# Patient Record
Sex: FEMALE | Race: WHITE
[De-identification: names, ages, dates, MRNs, and addresses within clinical notes are randomized per-mention and may not be internally consistent; named-entity substitution may affect disease eponyms.]

---

## 2020-05-11 ENCOUNTER — HOSPITAL ENCOUNTER (INPATIENT)
Dept: HOSPITAL 41 - JD.OBCHECK | Age: 40
LOS: 3 days | Discharge: HOME | DRG: 560 | End: 2020-05-14
Attending: OBSTETRICS & GYNECOLOGY | Admitting: OBSTETRICS & GYNECOLOGY
Payer: COMMERCIAL

## 2020-05-11 DIAGNOSIS — Z3A.40: ICD-10-CM

## 2020-05-11 DIAGNOSIS — D25.9: ICD-10-CM

## 2020-05-11 DIAGNOSIS — O34.13: ICD-10-CM

## 2020-05-11 DIAGNOSIS — O48.0: Primary | ICD-10-CM

## 2020-05-11 RX ADMIN — Medication SCH MLS/HR: at 18:08

## 2020-05-11 NOTE — PCM.LDHP
L&D History of Present Illness





- General


Date of Service: 20


Admit Problem/Dx: 


 Patient Status Order with Admit Dx/Problem





20 16:31


Patient Status [ADT] Routine 








 Admission Diagnosis/Problem











Admission Diagnosis/Problem    Gestational hypertension














Source of Information: Patient


History Limitations: Reports: No Limitations





- History of Present Illness


Introduction:: 





Patient is a 40 y/o  at 40 6/7 wks who presents for IOL.  Was seen in 

clinic today and noted to have a mild range BP.  Otherwise doing well.  Had 

been scheduled for post dates IOL tomorrow anyhow 





- Related Data


Allergies/Adverse Reactions: 


 Allergies











Allergy/AdvReac Type Severity Reaction Status Date / Time


 


No Known Allergies Allergy   Verified 20 16:33











Home Medications: 


 Home Meds





Pnv No.95/Ferrous Fum/Folic AC [Prenatal Tablet] 1 tab PO DAILY 01/10/17 [

History]











Past Medical History


OB/GYN History: Reports: Pregnancy, Spontaneous , Other (See Below) (Hx 

of preeclampsia in 1 pregnancy)


: 8


Para: 4 (3 living children)


Endocrine/Metabolic History: Reports: Obesity/BMI 30+





- Past Surgical History


HEENT Surgical History: Reports: Oral Surgery (tooth extraction)


Musculoskeletal Surgical History: Reports: Other (See Below) (Tendon surgery, 

ankle surgery)





Social & Family History





- Family History


Family Medical History: Noncontributory





- Tobacco Use


Smoking Status *Q: Never Smoker





- Caffeine Use


Caffeine Use: Reports: Coffee





- Alcohol Use


Alcohol Use History: No





- Recreational Drug Use


Recreational Drug Use: No


Drug Use in Last 12 Months: No





H&P Review of Systems





- Review of Systems:


Review Of Systems: See Below


General: Reports: No Symptoms


Pulmonary: Reports: No Symptoms


Cardiovascular: Reports: No Symptoms


Gastrointestinal: Reports: No Symptoms


Genitourinary: Reports: No Symptoms


Musculoskeletal: Reports: No Symptoms


Psychiatric: Reports: No Symptoms


Neurological: Reports: No Symptoms





L&D Exam





- Exam


Exam: See Below





- Vital Signs


Weight: 139.253 kg





- OB Specific


Contraction Intensity: Irritability


Fetal Movement: Active


Fetal Heart Tones: Present


Fetal Heart Tones per Min: 130


Fetal Heart Rate (FHR) Variability: Moderate (6-25 bmp)


Birth Presentation: Vertex





- Peralta Score


Peralta Score Cervix Position: Posterior


Peralta Score Consistency: Soft


Peralta Score Effacement: 0-30%


Peralta Score Dilation: 1-2 cm


Peralta Score Infant's Station: -3


Peralta Score Total: 3





- Exam


General: Alert, Oriented, Cooperative


Lungs: Clear to Auscultation, Normal Respiratory Effort


Cardiovascular: Regular Rate, Regular Rhythm


GI/Abdominal Exam: Soft, Non-Tender


Genitourinary: Normal external exam


Extremities: Normal Inspection


Skin: Warm, Dry, Intact





- Patient Data


Result Diagrams: 


 20 16:50





 20 16:50





- Problem List


(1) Post-dates pregnancy


SNOMED Code(s): 45831589


   ICD Code: O48.0 - POST-TERM PREGNANCY   Status: Acute   Current Visit: Yes   


Qualifiers: 


   Post-term pregnancy type: 40-42 weeks gestation   Qualified Code(s): O48.0 - 

Post-term pregnancy   





(2) Fibroid uterus


SNOMED Code(s): 96857790


   ICD Code: D25.9 - LEIOMYOMA OF UTERUS, UNSPECIFIED   Status: Acute   Current 

Visit: Yes   


Qualifiers: 


   Uterine leiomyoma location: unspecified location   Qualified Code(s): D25.9 

- Leiomyoma of uterus, unspecified   


Problem List Initiated/Reviewed/Updated: Yes


Orders Last 24hrs: 


 Active Orders 24 hr











 Category Date Time Status


 


 Patient Status [ADT] Routine ADT  20 16:31 Ordered


 


 Activity as Tolerated [RC] PFP Care  20 16:31 Ordered


 


 Communication Order [RC] ASDIRECTED Care  20 16:31 Ordered


 


 Communication Order [RC] ASDIRECTED Care  20 16:31 Ordered


 


 Communication Order [RC] ASDIRECTED Care  20 16:31 Ordered


 


 Fetal Heart Tones [RC] ASDIRECTED Care  20 16:32 Ordered


 


 Fetal Non Stress Test [RC] PER UNIT ROUTINE Care  20 16:31 Ordered


 


 Notify Provider [RC] ASDIRECTED Care  20 16:31 Ordered


 


 Notify Provider [RC] PRN Care  20 16:31 Ordered


 


 Peripheral IV Care [RC] .AS DIRECTED Care  20 16:32 Ordered


 


 Vaginal Exam [RC] ASDIRECTED Care  20 16:31 Ordered


 


 Vital Signs [RC] ASDIRECTED Care  20 16:31 Ordered


 


 Regular Diet [DIET] Diet  20 Dinner Ordered


 


 ALANINE AMINOTRANSFERASE,ALT [CHEM] Routine Lab  20 16:31 Ordered


 


 ASPARTATE AMNIOTRANSFERASE,AST [CHEM] Routine Lab  20 16:31 Ordered


 


 CBC W/O DIFF,HEMOGRAM [HEME] Stat Lab  20 16:31 Ordered


 


 CREATININE W/GFR [CHEM] Stat Lab  20 16:31 Ordered


 


 PROTEIN/CREATININE RATIO,URINE [URCHEM] Routine Lab  20 16:31 Ordered


 


 RAPID PLASMA REAGIN,RPR [CHEM] Routine Lab  20 16:31 Ordered


 


 TYPE AND SCREEN [BBK] Stat Lab  20 16:31 Ordered


 


 Lactated Ringers @ 125 MLS/HR(1000ml) Med  20 16:45 Ordered





 Lactated Ringers [Ringers, Lactated] 1,000 ml   





 IV ASDIRECTED   


 


 Lactated Ringers [Ringers, Lactated] 1,000 ml Med  20 16:45 Ordered





 IV ASDIRECTED   


 


 Nalbuphine [Nubain] Med  20 16:31 Ordered





 10 mg IVPUSH Q2H PRN   


 


 Ondansetron [Zofran] Med  20 16:31 Ordered





 4 mg IVPUSH Q4H PRN   


 


 Oxytocin/Lactated Ringers [Pitocin in LR 10 Units/1,000 Med  20 16:45 

Ordered





 ML]   





 10 unit in 1,000 ml IV .CONTINUOUS   


 


 Oxytocin/Lactated Ringers [Pitocin in LR 10 Units/1,000 Med  20 16:45 

Ordered





 ML]   





 10 unit in 1,000 ml IV TITRATE   


 


 Sodium Chloride 0.9% [Saline Flush] Med  20 16:31 Ordered





 10 ml FLUSH ASDIRECTED PRN   


 


 Electronic Fetal Heart Tones Ext w TOCO [WOMSER] Oth  20 16:31 Ordered





 Routine   


 


 Electronic Fetal Heart Tones Internal [WOMSER] Per Unit Oth  20 16:31 

Ordered





 Routine   


 


 Peripheral IV Insertion Adult [OM.PC] Routine Oth  20 16:31 Ordered








 Medication Orders





Lactated Ringer's (Ringers, Lactated)  1,000 mls @ 40 mls/hr IV ASDIRECTED YARED


Lactated Ringer's (Ringers, Lactated)  1,000 mls @ 125 mls/hr IV ASDIRECTED YARED


Oxytocin/Lactated Ringer's (Pitocin In Lr 10 Units/1,000 Ml)  10 unit in 1,000 

mls @ 12 mls/hr IV TITRATE YARED; Protocol


Oxytocin/Lactated Ringer's (Pitocin In Lr 10 Units/1,000 Ml)  10 unit in 1,000 

mls @ 500 mls/hr IV .CONTINUOUS YARED


Nalbuphine HCl (Nubain)  10 mg IVPUSH Q2H PRN


   PRN Reason: Pain


Ondansetron HCl (Zofran)  4 mg IVPUSH Q4H PRN


   PRN Reason: Nausea/Vomiting


Sodium Chloride (Saline Flush)  10 ml FLUSH ASDIRECTED PRN


   PRN Reason: Keep Vein Open








Assessment/Plan Comment:: 





* Labs done including LFT's, creatinine, and urine protein/creatinine ratio


* Monitor BP's closely 


* Pitocin for IOL.  AROM when able 


* Monitor labor curve closely.  Known to have a 12 cm ANKIT fibroid 


* GBS negative, no need for antibiotics 


* Pain management per patient preference 


* Anticipate

## 2020-05-12 PROCEDURE — 3E033VJ INTRODUCTION OF OTHER HORMONE INTO PERIPHERAL VEIN, PERCUTANEOUS APPROACH: ICD-10-PCS | Performed by: OBSTETRICS & GYNECOLOGY

## 2020-05-12 PROCEDURE — 3E0R3BZ INTRODUCTION OF ANESTHETIC AGENT INTO SPINAL CANAL, PERCUTANEOUS APPROACH: ICD-10-PCS | Performed by: OBSTETRICS & GYNECOLOGY

## 2020-05-12 PROCEDURE — 10907ZC DRAINAGE OF AMNIOTIC FLUID, THERAPEUTIC FROM PRODUCTS OF CONCEPTION, VIA NATURAL OR ARTIFICIAL OPENING: ICD-10-PCS | Performed by: OBSTETRICS & GYNECOLOGY

## 2020-05-12 PROCEDURE — 10H07YZ INSERTION OF OTHER DEVICE INTO PRODUCTS OF CONCEPTION, VIA NATURAL OR ARTIFICIAL OPENING: ICD-10-PCS | Performed by: OBSTETRICS & GYNECOLOGY

## 2020-05-12 PROCEDURE — 0HQ9XZZ REPAIR PERINEUM SKIN, EXTERNAL APPROACH: ICD-10-PCS | Performed by: OBSTETRICS & GYNECOLOGY

## 2020-05-12 PROCEDURE — 00HU33Z INSERTION OF INFUSION DEVICE INTO SPINAL CANAL, PERCUTANEOUS APPROACH: ICD-10-PCS | Performed by: OBSTETRICS & GYNECOLOGY

## 2020-05-12 RX ADMIN — Medication SCH MLS/HR: at 08:54

## 2020-05-12 RX ADMIN — WITCH HAZEL PRN TUB: 500 SOLUTION RECTAL; TOPICAL at 18:42

## 2020-05-12 NOTE — PCM.PNLD
Labor Progress Note





- VS & Meds


Vital Signs: 


 Last Vital Signs











Temp  36.7 C   05/11/20 16:31


 


Pulse  81   05/11/20 16:31


 


Resp  18   05/11/20 16:31


 


BP  136/75   05/11/20 16:31


 


Pulse Ox  98   05/11/20 16:31











Active Medications: 


 Current Medications





Diphenhydramine HCl (Benadryl)  25 mg IVPUSH Q6H PRN


   PRN Reason: pruritis


Ephedrine Sulfate (Ephedrine Sulfate)  5 mg IVPUSH ASDIRECTED PRN


   PRN Reason: Hypotension


Fentanyl/Bupivacaine HCl (Fentanyl/Bupivacaine/Ns 2 Mcg-0.125% 100 Ml)  100 ml 

EPIDUR ASDIRECTED YARED


   Last Admin: 05/12/20 07:51 Dose:  100 ml


Lactated Ringer's (Ringers, Lactated)  1,000 mls @ 40 mls/hr IV ASDIRECTED YARED


   Last Admin: 05/12/20 08:50 Dose:  50 mls/hr


Lactated Ringer's (Ringers, Lactated)  1,000 mls @ 125 mls/hr IV ASDIRECTED YARED


Oxytocin/Lactated Ringer's (Pitocin In Lr 10 Units/1,000 Ml)  10 unit in 1,000 

mls @ 12 mls/hr IV TITRATE YARED; Protocol


   Last Admin: 05/12/20 08:54 Dose:  16 munits/min, 96 mls/hr


Oxytocin/Lactated Ringer's (Pitocin In Lr 10 Units/1,000 Ml)  10 unit in 1,000 

mls @ 500 mls/hr IV .CONTINUOUS YARED


Nalbuphine HCl (Nubain)  10 mg IVPUSH Q2H PRN


   PRN Reason: Pain


Ondansetron HCl (Zofran)  4 mg IVPUSH Q4H PRN


   PRN Reason: Nausea/Vomiting


Sodium Chloride (Saline Flush)  10 ml FLUSH ASDIRECTED PRN


   PRN Reason: Keep Vein Open





Discontinued Medications





Fentanyl (Sublimaze) Confirm Administered Dose 100 mcg .ROUTE .STK-MED ONE


   Stop: 05/12/20 07:42


   Last Admin: 05/12/20 07:50 Dose:  Not Given


Fentanyl (Sublimaze)  100 mcg IVPUSH ONETIME ONE


   Stop: 05/12/20 07:47


   Last Admin: 05/12/20 07:50 Dose:  100 mcg











- Uterine Contractions


Uterine Monitoring Mode: External [place]


Contraction Intensity: Moderate


Uterine Resting Tone: Soft





- Fetal Monitoring


Fetal Monitor Mode: External Ultrasound


Fetal Heart Rate (FHR) Baseline: 140


Fetal Heart Rate (FHR) Variability: Moderate (6-25 bmp)


Fetal Accelerations: Present, 15x15


Fetal Decelerations: Variable


Fetal Strip Review: Category II





- Vaginal Exam


Dilation (cm): 4


Effacement (Percent): 50


Station: -3


Cervical Position: Midposition





- Labor Progress (Free Text)


Labor Progress: 





Doing well.  Comfortable with epidural.  IUPC and FSE placed.  pitocin at 16.  

Continue management

## 2020-05-12 NOTE — PCM.PREANE
Preanesthetic Assessment





- Procedure


Proposed Procedure: 





alayna





- Anesthesia/Transfusion/Family Hx


Anesthesia History: Prior Anesthesia Without Reaction


Family History of Anesthesia Reaction: No


Transfusion History: No Prior Transfusion(s)





- Review of Systems


General: No Symptoms


Pulmonary: No Symptoms


Cardiovascular: No Symptoms


Gastrointestinal: No Symptoms


Neurological: No Symptoms





- Physical Assessment


Vital Signs: 





 Last Vital Signs











Temp  98.0 F   20 16:31


 


Pulse  81   20 16:31


 


Resp  18   20 16:31


 


BP  136/75   20 16:31


 


Pulse Ox  98   20 16:31











Height: 5 ft 11 in


Weight: 139.253 kg


ASA Class: 3


Mental Status: Alert & Oriented x3


Airway Class: Mallampati = 1


Dentition: Reports: Normal Dentition


Thyro-Mental Finger Breadths: 3


Mouth Opening Finger Breadths: 3


ROM/Head Extension: Full


Lungs: Clear to Auscultation, Normal Respiratory Effort


Cardiovascular: Regular Rate, Regular Rhythm





- Lab


Values: 





 Laboratory Last Values











WBC  12.52 K/mm3 (3.98-10.04)  H  20  16:50    


 


RBC  4.87 M/mm3 (3.98-5.22)   20  16:50    


 


Hgb  13.5 gm/dl (11.2-15.7)  D 20  16:50    


 


Hct  42.7 % (34.1-44.9)   20  16:50    


 


MCV  87.7 fl (79.4-94.8)  D 20  16:50    


 


MCH  27.7 pg (25.6-32.2)   20  16:50    


 


MCHC  31.6 g/dl (32.2-35.5)  L  20  16:50    


 


RDW Std Deviation  49.2 fL (36.4-46.3)  H  20  16:50    


 


Plt Count  243 K/mm3 (182-369)   20  16:50    


 


MPV  12.3 fl (9.4-12.3)   20  16:50    


 


Creatinine  0.8 mg/dL (0.55-1.02)   20  16:50    


 


Est Cr Clr Drug Dosing  106.57 mL/min  20  16:50    


 


Estimated GFR (MDRD)  > 60 mL/min (>60)   20  16:50    


 


AST  19 U/L (15-37)   20  16:50    


 


ALT  28 U/L (14-59)   20  16:50    


 


Ur Random Creatinine  64.8 mg/dL (30.0-125.0)   20  19:55    


 


U Random Total Protein  8.4 mg/dL (0.0-11.8)   20  19:55    


 


Protein/Creatinin Ratio  129.6 mg/g (0-149)   20  19:55    


 


RPR  Non-reactive  (NONREACTIVE)   20  16:50    


 


Blood Type  O POSITIVE   20  16:50    


 


Gel Antibody Screen  Negative   20  16:50    














- Allergies


Allergies/Adverse Reactions: 


 Allergies











Allergy/AdvReac Type Severity Reaction Status Date / Time


 


No Known Allergies Allergy   Verified 20 16:33














- Blood


Blood Available: No





- Acknowledgements


Anesthesia Type Planned: Epidural


Pt an Appropriate Candidate for the Planned Anesthesia: Yes


Alternatives and Risks of Anesthesia Discussed w Pt/Guardian: Yes


Pt/Guardian Understands and Agrees with Anesthesia Plan: Yes





PreAnesthesia Questionnaire





- Past Health History


Medical/Surgical History: Denies Medical/Surgical History


Cardiovascular History: Reports: None


Respiratory History: Reports: None


Gastrointestinal History: Reports: GERD (with preg)


OB/GYN History: Reports: Pregnancy, Spontaneous , Other (See Below) (Hx 

of preeclampsia in 1 pregnancy)


: 8


Para: 3


Endocrine/Metabolic History: Reports: Obesity/BMI 30+





- Past Surgical History


HEENT Surgical History: Reports: Oral Surgery (tooth extraction)


Musculoskeletal Surgical History: Reports: Other (See Below) (Tendon surgery, 

ankle surgery)





- SUBSTANCE USE


Smoking Status *Q: Never Smoker


Tobacco Use Within Last Twelve Months: No


Second Hand Smoke Exposure: No


Days Per Week of Alcohol Use: 0


Recreational Drug Use History: No





- HOME MEDS


Home Medications: 


 Home Meds





Pnv No.95/Ferrous Fum/Folic AC [Prenatal Tablet] 1 tab PO DAILY 01/10/17 [

History]











- CURRENT (IN HOUSE) MEDS


Current Meds: 





 Current Medications





Fentanyl/Bupivacaine HCl (Fentanyl/Bupivacaine/Ns 2 Mcg-0.125% 100 Ml)  100 ml 

EPIDUR ASDIRECTED On license of [facility]


   Last Admin: 20 07:51 Dose:  100 ml


Lactated Ringer's (Ringers, Lactated)  1,000 mls @ 40 mls/hr IV ASDIRECTED YARED


   Last Admin: 20 07:22 Dose:  999 mls/hr


Lactated Ringer's (Ringers, Lactated)  1,000 mls @ 125 mls/hr IV ASDIRECTED YARED


Oxytocin/Lactated Ringer's (Pitocin In Lr 10 Units/1,000 Ml)  10 unit in 1,000 

mls @ 12 mls/hr IV TITRATE YARED; Protocol


   Last Titration: 20 00:30 Dose:  16 munits/min, 96 mls/hr


Oxytocin/Lactated Ringer's (Pitocin In Lr 10 Units/1,000 Ml)  10 unit in 1,000 

mls @ 500 mls/hr IV .CONTINUOUS YARED


Nalbuphine HCl (Nubain)  10 mg IVPUSH Q2H PRN


   PRN Reason: Pain


Ondansetron HCl (Zofran)  4 mg IVPUSH Q4H PRN


   PRN Reason: Nausea/Vomiting


Sodium Chloride (Saline Flush)  10 ml FLUSH ASDIRECTED PRN


   PRN Reason: Keep Vein Open





Discontinued Medications





Fentanyl (Sublimaze) Confirm Administered Dose 100 mcg .ROUTE .STK-MED ONE


   Stop: 20 07:42


   Last Admin: 20 07:50 Dose:  Not Given


Fentanyl (Sublimaze)  100 mcg IVPUSH ONETIME ONE


   Stop: 20 07:47


   Last Admin: 20 07:50 Dose:  100 mcg

## 2020-05-12 NOTE — PCM.DEL
L & D Note





- General Info


Date of Service: 20





- Delivery Note


Labor: Induced by ARM, Induced by Oxytocin


Delivery Outcome: Livebirth


Infant Delivery Method: Spontaneous Vaginal Delivery-Single


Infant Delivery Mode: Spontaneous


Birth Presentation: Right Occiput Posterior (ROP) (Mostly straight OP)


Nuchal Cord: None


Anesthesia Type: Epidural


Amniotic Fluid Description: Clear


Episiotomy Type: None


Laceration: 1st Degree


Suture type: Vicryl


Suture size: 2-0


Placenta: Intact, Spontaneous


Cord: 3 Vessels


Estimated Blood Loss: 300


: Bulb Syringe, Stimulated, Warmed, [place] Used, Warmer Used


Delivery Comments (Free Text/Narrative):: 





Patient found to be complete and began pushing.  With maternal pushing effort 

fetal head delivered from straight OP presentation.  No nuchal cord present.  

With gentle downward traction fetal shoulders and body delivered.  Infant 

placed on maternal abdomen. Cord clamped and cut.  Cord blood obtained.  Cord 

blood obtained.  Placenta allowed time to separate and expelled intact.  

Patient with moderate amount of slow bleeding and so given 600 mcg of buccal 

cytotec with good response 





- General Info


Date of Service: 20





- Patient Data


Vitals - Most Recent: 


 Last Vital Signs











Temp  36.7 C   20 16:31


 


Pulse  81   20 16:31


 


Resp  18   20 16:31


 


BP  136/75   20 16:31


 


Pulse Ox  98   20 16:31











Weight - Most Recent: 139.253 kg


I&O - Last 24 Hours: 


 Intake & Output











 20





 06:59 14:59 22:59


 


Intake Total   


 


Balance   











Lab Results Last 24 Hours: 


 Laboratory Results - last 24 hr











  20 Range/Units





  16:50 16:50 16:50 


 


WBC    12.52 H  (3.98-10.04)  K/mm3


 


RBC    4.87  (3.98-5.22)  M/mm3


 


Hgb    13.5  D  (11.2-15.7)  gm/dl


 


Hct    42.7  (34.1-44.9)  %


 


MCV    87.7  D  (79.4-94.8)  fl


 


MCH    27.7  (25.6-32.2)  pg


 


MCHC    31.6 L  (32.2-35.5)  g/dl


 


RDW Std Deviation    49.2 H  (36.4-46.3)  fL


 


Plt Count    243  (182-369)  K/mm3


 


MPV    12.3  (9.4-12.3)  fl


 


Creatinine     (0.55-1.02)  mg/dL


 


Est Cr Clr Drug Dosing     mL/min


 


Estimated GFR (MDRD)     (>60)  mL/min


 


AST  19    (15-37)  U/L


 


ALT  28    (14-59)  U/L


 


Ur Random Creatinine     (30.0-125.0)  mg/dL


 


U Random Total Protein     (0.0-11.8)  mg/dL


 


Protein/Creatinin Ratio     (0-149)  mg/g


 


RPR   Non-reactive   (NONREACTIVE)  


 


Blood Type     


 


Gel Antibody Screen     














  20 Range/Units





  16:50 16:50 19:55 


 


WBC     (3.98-10.04)  K/mm3


 


RBC     (3.98-5.22)  M/mm3


 


Hgb     (11.2-15.7)  gm/dl


 


Hct     (34.1-44.9)  %


 


MCV     (79.4-94.8)  fl


 


MCH     (25.6-32.2)  pg


 


MCHC     (32.2-35.5)  g/dl


 


RDW Std Deviation     (36.4-46.3)  fL


 


Plt Count     (182-369)  K/mm3


 


MPV     (9.4-12.3)  fl


 


Creatinine  0.8    (0.55-1.02)  mg/dL


 


Est Cr Clr Drug Dosing  106.57    mL/min


 


Estimated GFR (MDRD)  > 60    (>60)  mL/min


 


AST     (15-37)  U/L


 


ALT     (14-59)  U/L


 


Ur Random Creatinine    64.8  (30.0-125.0)  mg/dL


 


U Random Total Protein    8.4  (0.0-11.8)  mg/dL


 


Protein/Creatinin Ratio    129.6  (0-149)  mg/g


 


RPR     (NONREACTIVE)  


 


Blood Type   O POSITIVE   


 


Gel Antibody Screen   Negative   











Med Orders - Current: 


 Current Medications





Diphenhydramine HCl (Benadryl)  25 mg IVPUSH Q6H PRN


   PRN Reason: pruritis


Ephedrine Sulfate (Ephedrine Sulfate)  5 mg IVPUSH ASDIRECTED PRN


   PRN Reason: Hypotension


Fentanyl/Bupivacaine HCl (Fentanyl/Bupivacaine/Ns 2 Mcg-0.125% 100 Ml)  100 ml 

EPIDUR ASDIRECTED YARED


   Last Admin: 20 07:51 Dose:  100 ml


Lactated Ringer's (Ringers, Lactated)  1,000 mls @ 40 mls/hr IV ASDIRECTED YARED


   Last Admin: 20 08:50 Dose:  50 mls/hr


Lactated Ringer's (Ringers, Lactated)  1,000 mls @ 125 mls/hr IV ASDIRECTED YARED


Oxytocin/Lactated Ringer's (Pitocin In Lr 10 Units/1,000 Ml)  10 unit in 1,000 

mls @ 12 mls/hr IV TITRATE YARED; Protocol


   Last Titration: 20 13:42 Dose:  20 munits/min, 120 mls/hr


Oxytocin/Lactated Ringer's (Pitocin In Lr 10 Units/1,000 Ml)  10 unit in 1,000 

mls @ 500 mls/hr IV .CONTINUOUS YARED


Nalbuphine HCl (Nubain)  10 mg IVPUSH Q2H PRN


   PRN Reason: Pain


Ondansetron HCl (Zofran)  4 mg IVPUSH Q4H PRN


   PRN Reason: Nausea/Vomiting


Sodium Chloride (Saline Flush)  10 ml FLUSH ASDIRECTED PRN


   PRN Reason: Keep Vein Open





Discontinued Medications





Fentanyl (Sublimaze) Confirm Administered Dose 100 mcg .ROUTE .STK-MED ONE


   Stop: 20 07:42


   Last Admin: 20 07:50 Dose:  Not Given


Fentanyl (Sublimaze)  100 mcg IVPUSH ONETIME ONE


   Stop: 20 07:47


   Last Admin: 20 07:50 Dose:  100 mcg


Misoprostol (Cytotec) Confirm Administered Dose 200 mcg .ROUTE .STK-MED ONE


   Stop: 20 16:28











- Problem List & Annotations


(1) Post-dates pregnancy


SNOMED Code(s): 22772917


   Code(s): O48.0 - POST-TERM PREGNANCY   Status: Acute   Current Visit: Yes   


Qualifiers: 


   Post-term pregnancy type: 40-42 weeks gestation   Qualified Code(s): O48.0 - 

Post-term pregnancy   





(2) Fibroid uterus


SNOMED Code(s): 33671733


   Code(s): D25.9 - LEIOMYOMA OF UTERUS, UNSPECIFIED   Status: Acute   Current 

Visit: Yes   


Qualifiers: 


   Uterine leiomyoma location: unspecified location   Qualified Code(s): D25.9 

- Leiomyoma of uterus, unspecified   





(3) Vaginal delivery


SNOMED Code(s): 680060335


   Code(s): O80 - ENCOUNTER FOR FULL-TERM UNCOMPLICATED DELIVERY   Status: 

Acute   Current Visit: Yes   





- Problem List Review


Problem List Initiated/Reviewed/Updated: Yes





- My Orders


Last 24 Hours: 


My Active Orders





20 16:31


Patient Status [ADT] Routine 


Activity as Tolerated [RC] PFP 


Communication Order [RC] ASDIRECTED 


Fetal Non Stress Test [RC] PER UNIT ROUTINE 


Notify Provider [RC] ASDIRECTED 


Notify Provider [RC] PRN 


Vaginal Exam [RC] ASDIRECTED 


Vital Signs [RC] ASDIRECTED 


Nalbuphine [Nubain]   10 mg IVPUSH Q2H PRN 


Ondansetron [Zofran]   4 mg IVPUSH Q4H PRN 


Sodium Chloride 0.9% [Saline Flush]   10 ml FLUSH ASDIRECTED PRN 


Electronic Fetal Heart Tones Ext w TOCO [WOMSER] Routine 


Electronic Fetal Heart Tones Internal [WOMSER] Per Unit Routine 


Peripheral IV Insertion Adult [OM.PC] Routine 





20 16:32


Fetal Heart Tones [RC] ASDIRECTED 


Peripheral IV Care [RC] .AS DIRECTED 





20 16:45


Lactated Ringers [Ringers, Lactated] 1,000 ml IV ASDIRECTED 


Lactated Ringers [Ringers, Lactated] 1,000 ml IV ASDIRECTED 


Oxytocin/Lactated Ringers [Pitocin in LR 10 Units/1,000 ML] 10 unit in 1,000 ml 

IV .CONTINUOUS 


Oxytocin/Lactated Ringers [Pitocin in LR 10 Units/1,000 ML] 10 unit in 1,000 ml 

IV TITRATE 





20 Dinner


Regular Diet [DIET] 














- Assessment


Assessment:: 





PPD#0 





- Plan


Plan:: 





* Routine cares 


* Monitor BP's closely 


* Breast feeding 


* Discharge home in 1-2 days

## 2020-05-12 NOTE — PCM.PNLD
Labor Progress Note





- VS & Meds


Vital Signs: 


 Last Vital Signs











Temp  36.7 C   05/11/20 16:31


 


Pulse  81   05/11/20 16:31


 


Resp  18   05/11/20 16:31


 


BP  136/75   05/11/20 16:31


 


Pulse Ox  98   05/11/20 16:31











Active Medications: 


 Current Medications





Lactated Ringer's (Ringers, Lactated)  1,000 mls @ 40 mls/hr IV ASDIRECTED YARED


   Last Admin: 05/11/20 18:08 Dose:  40 mls/hr


Lactated Ringer's (Ringers, Lactated)  1,000 mls @ 125 mls/hr IV ASDIRECTED YARED


Oxytocin/Lactated Ringer's (Pitocin In Lr 10 Units/1,000 Ml)  10 unit in 1,000 

mls @ 12 mls/hr IV TITRATE YARED; Protocol


   Last Titration: 05/11/20 22:45 Dose:  12 munits/min, 72 mls/hr


Oxytocin/Lactated Ringer's (Pitocin In Lr 10 Units/1,000 Ml)  10 unit in 1,000 

mls @ 500 mls/hr IV .CONTINUOUS YARED


Nalbuphine HCl (Nubain)  10 mg IVPUSH Q2H PRN


   PRN Reason: Pain


Ondansetron HCl (Zofran)  4 mg IVPUSH Q4H PRN


   PRN Reason: Nausea/Vomiting


Sodium Chloride (Saline Flush)  10 ml FLUSH ASDIRECTED PRN


   PRN Reason: Keep Vein Open











- Uterine Contractions


Uterine Monitoring Mode: External Poole


Contraction Intensity: Moderate


Uterine Resting Tone: Soft





- Fetal Monitoring


Fetal Monitor Mode: External Ultrasound


Fetal Heart Rate (FHR) Baseline: 130


Fetal Heart Rate (FHR) Variability: Moderate (6-25 bmp)


Fetal Accelerations: Present, 15x15


Fetal Decelerations: None


Fetal Strip Review: Category I





- Vaginal Exam


Dilation (cm):  3


Effacement (Percent): 50


Station: -3


Cervical Position: Posterior





- Labor Progress (Free Text)


Labor Progress: 


Patient doing well.  Getting more uncomfortable.  Bedside US shows baby well 

engaged and feels so as well. AROM performed

## 2020-05-12 NOTE — PCM.PNLD
Labor Progress Note





- VS & Meds


Vital Signs: 


 Last Vital Signs











Temp  36.7 C   05/11/20 16:31


 


Pulse  81   05/11/20 16:31


 


Resp  18   05/11/20 16:31


 


BP  136/75   05/11/20 16:31


 


Pulse Ox  98   05/11/20 16:31











Active Medications: 


 Current Medications





Diphenhydramine HCl (Benadryl)  25 mg IVPUSH Q6H PRN


   PRN Reason: pruritis


Ephedrine Sulfate (Ephedrine Sulfate)  5 mg IVPUSH ASDIRECTED PRN


   PRN Reason: Hypotension


Fentanyl/Bupivacaine HCl (Fentanyl/Bupivacaine/Ns 2 Mcg-0.125% 100 Ml)  100 ml 

EPIDUR ASDIRECTED YARED


   Last Admin: 05/12/20 07:51 Dose:  100 ml


Lactated Ringer's (Ringers, Lactated)  1,000 mls @ 40 mls/hr IV ASDIRECTED YARED


   Last Admin: 05/12/20 08:50 Dose:  50 mls/hr


Lactated Ringer's (Ringers, Lactated)  1,000 mls @ 125 mls/hr IV ASDIRECTED YARED


Oxytocin/Lactated Ringer's (Pitocin In Lr 10 Units/1,000 Ml)  10 unit in 1,000 

mls @ 12 mls/hr IV TITRATE YARED; Protocol


   Last Admin: 05/12/20 08:54 Dose:  16 munits/min, 96 mls/hr


Oxytocin/Lactated Ringer's (Pitocin In Lr 10 Units/1,000 Ml)  10 unit in 1,000 

mls @ 500 mls/hr IV .CONTINUOUS YARED


Nalbuphine HCl (Nubain)  10 mg IVPUSH Q2H PRN


   PRN Reason: Pain


Ondansetron HCl (Zofran)  4 mg IVPUSH Q4H PRN


   PRN Reason: Nausea/Vomiting


Sodium Chloride (Saline Flush)  10 ml FLUSH ASDIRECTED PRN


   PRN Reason: Keep Vein Open





Discontinued Medications





Fentanyl (Sublimaze) Confirm Administered Dose 100 mcg .ROUTE .STK-MED ONE


   Stop: 05/12/20 07:42


   Last Admin: 05/12/20 07:50 Dose:  Not Given


Fentanyl (Sublimaze)  100 mcg IVPUSH ONETIME ONE


   Stop: 05/12/20 07:47


   Last Admin: 05/12/20 07:50 Dose:  100 mcg











- Uterine Contractions


Uterine Monitoring Mode: External Counce


Contraction Intensity: Moderate


Uterine Resting Tone: Soft





- Fetal Monitoring


Fetal Monitor Mode: External Ultrasound


Fetal Heart Rate (FHR) Baseline: 140


Fetal Heart Rate (FHR) Variability: Moderate (6-25 bmp)


Fetal Accelerations: Present, 15x15


Fetal Decelerations: Variable, Intermittent (<50% x 20 min)


Fetal Strip Review: Category II





- Vaginal Exam


Dilation (cm): 4


Effacement (Percent): 5


Station: -3


Cervical Position: Midposition





- Labor Progress (Free Text)


Labor Progress: 





Patient with a few late and variable decelerations.  SVE with slightly more 

dilation, but still -3.  Pitocin still at 16.  Continue present management.

## 2020-05-13 RX ADMIN — WITCH HAZEL PRN TUB: 500 SOLUTION RECTAL; TOPICAL at 18:09

## 2020-05-13 NOTE — [id number]
Post Anesthesia Note





- EVALUATION WITHIN 48HRS OF ANESTHETIC


Vital Signs in Normal Range: Yes


Patient Participated in Evaluation: Yes


Respiratory Function Stable: Yes


Airway Patent: Yes


Cardiovascular Function Stable: Yes


Hydration Status Stable: Yes


Pain Control Satisfactory: Yes


Nausea and Vomiting Control Satisfactory: Yes


Mental Status Recovered: Yes


Vital Signs: 


 Last Vital Signs











Temp  36.5 C   05/13/20 03:39


 


Pulse  82   05/13/20 03:39


 


Resp  18   05/13/20 03:39


 


BP  124/62   05/13/20 03:39


 


Pulse Ox  96   05/13/20 03:39

## 2020-05-13 NOTE — PCM.DCSUM1
**Discharge Summary





- Discharge Data


Discharge Date: 20


Discharge Disposition: Home, Self-Care 01


Condition: Good





- Referral to Home Health


Primary Care Physician: 


Calista Watson MD








- Discharge Diagnosis/Problem(s)


(1) Post-dates pregnancy


SNOMED Code(s): 07774260


   ICD Code: O48.0 - POST-TERM PREGNANCY   Status: Acute   Current Visit: Yes   


Qualifiers: 


   Post-term pregnancy type: 40-42 weeks gestation   Qualified Code(s): O48.0 - 

Post-term pregnancy   





(2) Fibroid uterus


SNOMED Code(s): 48364569


   ICD Code: D25.9 - LEIOMYOMA OF UTERUS, UNSPECIFIED   Status: Acute   Current 

Visit: Yes   


Qualifiers: 


   Uterine leiomyoma location: unspecified location   Qualified Code(s): D25.9 

- Leiomyoma of uterus, unspecified   





(3) Vaginal delivery


SNOMED Code(s): 910105224


   ICD Code: O80 - ENCOUNTER FOR FULL-TERM UNCOMPLICATED DELIVERY   Status: 

Acute   Current Visit: Yes   





- Patient Summary/Data


Complications: None


Consults: 


None


Recommended Follow-up Testing/Procedures: 


Follow up in 3 weeks for postpartum check 


Hospital Course: 


38 y/o  at 40 6/7 wks who was seen for clinic appt and found to have a 

mild range BP.  Was sent to L&D for IOL.  Mostly normal BP's in induction.  

Induction complicated by known 12 cm ANKIT fibroid.  Induction done with pitocin 

and eventually AROM.  Patient made slow progress to complete dilation, but did 

eventually undergo an uncomplicated .  See delivery note.  Postpartum she 

did well and was discharged home on PPD#2





- Patient Instructions


Diet: Regular Diet as Tolerated


Activity: As Tolerated


Activity, Other: Pelvic rest for 6 weeks 


Driving: May Drive Today


Showering/Bathing: May Shower


Showering/Bathing, Other: May bathe 


Notify Provider of: Fever, Increased Pain, Swelling and Redness, Drainage, 

Nausea and/or Vomiting





- Discharge Plan


*PRESCRIPTION DRUG MONITORING PROGRAM REVIEWED*: No


*COPY OF PRESCRIPTION DRUG MONITORING REPORT IN PATIENT LAURO: No


Home Medications: 


 Home Meds





Pnv No.95/Ferrous Fum/Folic AC [Prenatal Tablet] 1 tab PO DAILY 01/10/17 [

History]


Acetaminophen [Tylenol] 650 mg PO Q4H PRN  tablet 20 [Rx]


Ibuprofen [Motrin] 600 mg PO Q6H PRN  tablet 20 [Rx]








Referrals: 


Calista Watson MD [Primary Care Provider] -  (3 weeks for postpartum check - 

can be via telehealth)





- Discharge Summary/Plan Comment


DC Time >30 min.: No





- Patient Data


Vitals - Most Recent: 


 Last Vital Signs











Temp  36.5 C   20 03:39


 


Pulse  82   20 03:39


 


Resp  18   20 03:39


 


BP  124/62   20 03:39


 


Pulse Ox  96   20 03:39











Weight - Most Recent: 139.253 kg


I&O - Last 24 hours: 


 Intake & Output











 20





 22:59 06:59 14:59


 


Intake Total 4300  


 


Output Total 1650  


 


Balance 2650  











Med Orders - Current: 


 Current Medications





Acetaminophen (Tylenol)  650 mg PO Q4H PRN


   PRN Reason: mild pain or fever


Benzocaine/Menthol (Dermoplast Pain Relief Spray)  0 gm TOP ASDIRECTED PRN


   PRN Reason: Perineal Comfort Measure


   Last Admin: 20 18:42 Dose:  1 can


Ibuprofen (Motrin)  600 mg PO Q6H PRN


   PRN Reason: Mild pain or fever


   Last Admin: 20 04:02 Dose:  600 mg


Witch Hazel (Tucks)  1 pad TOP ASDIRECTED PRN


   PRN Reason: Perineal Comfort Measure


   Last Admin: 20 18:42 Dose:  1 tub





Discontinued Medications





Diphenhydramine HCl (Benadryl)  25 mg IVPUSH Q6H PRN


   PRN Reason: pruritis


Ephedrine Sulfate (Ephedrine Sulfate)  5 mg IVPUSH ASDIRECTED PRN


   PRN Reason: Hypotension


Fentanyl (Sublimaze) Confirm Administered Dose 100 mcg .ROUTE .STK-MED ONE


   Stop: 20 07:42


   Last Admin: 20 07:50 Dose:  Not Given


Fentanyl (Sublimaze)  100 mcg IVPUSH ONETIME ONE


   Stop: 20 07:47


   Last Admin: 20 07:50 Dose:  100 mcg


Fentanyl/Bupivacaine HCl (Fentanyl/Bupivacaine/Ns 2 Mcg-0.125% 100 Ml)  100 ml 

EPIDUR ASDIRECTED YARED


   Last Admin: 20 07:51 Dose:  100 ml


Lactated Ringer's (Ringers, Lactated)  1,000 mls @ 40 mls/hr IV ASDIRECTED YARED


   Last Admin: 20 08:50 Dose:  50 mls/hr


Lactated Ringer's (Ringers, Lactated)  1,000 mls @ 125 mls/hr IV ASDIRECTED YARED


Oxytocin/Lactated Ringer's (Pitocin In Lr 10 Units/1,000 Ml)  10 unit in 1,000 

mls @ 12 mls/hr IV TITRATE YARED; Protocol


   Last Titration: 20 13:42 Dose:  20 munits/min, 120 mls/hr


Oxytocin/Lactated Ringer's (Pitocin In Lr 10 Units/1,000 Ml)  10 unit in 1,000 

mls @ 500 mls/hr IV .CONTINUOUS YARED


   Last Admin: 20 17:00 Dose:  500 mls/hr


Misoprostol (Cytotec) Confirm Administered Dose 200 mcg .ROUTE .K-MED ONE


   Stop: 20 16:28


   Last Admin: 20 16:42 Dose:  600 mcg


Misoprostol (Cytotec)  600 mcg PO NOW STA


   Stop: 20 16:40


   Last Admin: 20 17:41 Dose:  Not Given


Nalbuphine HCl (Nubain)  10 mg IVPUSH Q2H PRN


   PRN Reason: Pain


Ondansetron HCl (Zofran)  4 mg IVPUSH Q4H PRN


   PRN Reason: Nausea/Vomiting


Sodium Chloride (Saline Flush)  10 ml FLUSH ASDIRECTED PRN


   PRN Reason: Keep Vein Open

## 2020-05-13 NOTE — PCM.PNPP
- General Info


Date of Service: 20


Functional Status: Reports: Pain Controlled, Tolerating Diet, Ambulating, 

Urinating





- Review of Systems


General: Reports: No Symptoms


Pulmonary: Reports: No Symptoms


Cardiovascular: Reports: No Symptoms


Gastrointestinal: Reports: No Symptoms


Genitourinary: Reports: No Symptoms


Musculoskeletal: Reports: No Symptoms


Neurological: Reports: No Symptoms





- Patient Data


Vital Signs - Most Recent: 


 Last Vital Signs











Temp  36.5 C   20 03:39


 


Pulse  82   20 03:39


 


Resp  18   20 03:39


 


BP  124/62   20 03:39


 


Pulse Ox  96   20 03:39











Weight - Most Recent: 139.253 kg


I&O - Last 24 Hours: 


 Intake & Output











 20





 22:59 06:59 14:59


 


Intake Total 4300  


 


Output Total 1650  


 


Balance 2650  











Med Orders - Current: 


 Current Medications





Acetaminophen (Tylenol)  650 mg PO Q4H PRN


   PRN Reason: mild pain or fever


Benzocaine/Menthol (Dermoplast Pain Relief Spray)  0 gm TOP ASDIRECTED PRN


   PRN Reason: Perineal Comfort Measure


   Last Admin: 20 18:42 Dose:  1 can


Ibuprofen (Motrin)  600 mg PO Q6H PRN


   PRN Reason: Mild pain or fever


   Last Admin: 20 04:02 Dose:  600 mg


Witch Hazel (Tucks)  1 pad TOP ASDIRECTED PRN


   PRN Reason: Perineal Comfort Measure


   Last Admin: 20 18:42 Dose:  1 tub





Discontinued Medications





Diphenhydramine HCl (Benadryl)  25 mg IVPUSH Q6H PRN


   PRN Reason: pruritis


Ephedrine Sulfate (Ephedrine Sulfate)  5 mg IVPUSH ASDIRECTED PRN


   PRN Reason: Hypotension


Fentanyl (Sublimaze) Confirm Administered Dose 100 mcg .ROUTE .STK-MED ONE


   Stop: 20 07:42


   Last Admin: 20 07:50 Dose:  Not Given


Fentanyl (Sublimaze)  100 mcg IVPUSH ONETIME ONE


   Stop: 20 07:47


   Last Admin: 20 07:50 Dose:  100 mcg


Fentanyl/Bupivacaine HCl (Fentanyl/Bupivacaine/Ns 2 Mcg-0.125% 100 Ml)  100 ml 

EPIDUR ASDIRECTED YARED


   Last Admin: 20 07:51 Dose:  100 ml


Lactated Ringer's (Ringers, Lactated)  1,000 mls @ 40 mls/hr IV ASDIRECTED YARED


   Last Admin: 20 08:50 Dose:  50 mls/hr


Lactated Ringer's (Ringers, Lactated)  1,000 mls @ 125 mls/hr IV ASDIRECTED YARED


Oxytocin/Lactated Ringer's (Pitocin In Lr 10 Units/1,000 Ml)  10 unit in 1,000 

mls @ 12 mls/hr IV TITRATE YARED; Protocol


   Last Titration: 20 13:42 Dose:  20 munits/min, 120 mls/hr


Oxytocin/Lactated Ringer's (Pitocin In Lr 10 Units/1,000 Ml)  10 unit in 1,000 

mls @ 500 mls/hr IV .CONTINUOUS YARED


   Last Admin: 20 17:00 Dose:  500 mls/hr


Misoprostol (Cytotec) Confirm Administered Dose 200 mcg .ROUTE .STK-MED ONE


   Stop: 20 16:28


   Last Admin: 20 16:42 Dose:  600 mcg


Misoprostol (Cytotec)  600 mcg PO NOW STA


   Stop: 20 16:40


   Last Admin: 20 17:41 Dose:  Not Given


Nalbuphine HCl (Nubain)  10 mg IVPUSH Q2H PRN


   PRN Reason: Pain


Ondansetron HCl (Zofran)  4 mg IVPUSH Q4H PRN


   PRN Reason: Nausea/Vomiting


Sodium Chloride (Saline Flush)  10 ml FLUSH ASDIRECTED PRN


   PRN Reason: Keep Vein Open











- Infant Interaction


Infant Disposition, Postpartum:  in Room with Family


Infant Interaction: Holding Infant


Infant Feeding:  Infant; Nursed Well


Support Person: 





- Postpartum Recovery Exam


Fundal Tone: Firm


Fundal Level: 1 Fingerbreadths Below Umbilicus


Fundal Placement: Midline


Lochia Amount: Small, Moderate


Lochia Color: Rubra/Red


Bladder Status: Voiding


Urinary Elimination: Voided





- Exam


General: Alert, Oriented, Cooperative


GI/Abdominal Exam: Soft, Non-Tender


Extremities: Normal Inspection


Skin: Warm, Dry, Intact





- Problem List & Annotations


(1) Post-dates pregnancy


SNOMED Code(s): 06347488


   Code(s): O48.0 - POST-TERM PREGNANCY   Status: Acute   Current Visit: Yes   


Qualifiers: 


   Post-term pregnancy type: 40-42 weeks gestation   Qualified Code(s): O48.0 - 

Post-term pregnancy   





(2) Fibroid uterus


SNOMED Code(s): 03939227


   Code(s): D25.9 - LEIOMYOMA OF UTERUS, UNSPECIFIED   Status: Acute   Current 

Visit: Yes   


Qualifiers: 


   Uterine leiomyoma location: unspecified location   Qualified Code(s): D25.9 

- Leiomyoma of uterus, unspecified   





(3) Vaginal delivery


SNOMED Code(s): 141988768


   Code(s): O80 - ENCOUNTER FOR FULL-TERM UNCOMPLICATED DELIVERY   Status: 

Acute   Current Visit: Yes   





- Problem List Review


Problem List Initiated/Reviewed/Updated: Yes





- My Orders


Last 24 Hours: 


My Active Orders





20 16:38


Resuscitation Status Routine 





20 17:04


Activity as Tolerated [RC] PER UNIT ROUTINE 


Vital Signs [RC] 09,15,21,03 


Acetaminophen [Tylenol]   650 mg PO Q4H PRN 


Benzocaine/Menthol [Dermoplast Pain Relief Spray]   See Dose Instructions  TOP 

ASDIRECTED PRN 


Ibuprofen [Motrin]   600 mg PO Q6H PRN 


witch hazeL [Tucks]   1 pad TOP ASDIRECTED PRN 


Assess Lochia [WOMSER] Per Unit Routine 


Assess Uterine Involution [WOMSER] Per Unit Routine 


Breast Pump [WOMSER] Per Unit Routine 


Heat Therapy [OM.PC] PRN 


Perineal Care [OM.PC] Per Unit Routine 


Peripheral IV Discontinue [OM.PC] Routine 


Sitz Bath [OM.PC] Per Unit Routine 





20 Dinner


Regular Diet [DIET] 





20 17:04


Heat Therapy [OM.PC] PRN 














- Assessment


Assessment:: 





PPD#1





- Plan


Plan:: 





* Routine cares 


* BP's normal 


* Breast feeding 


* Discharge home tomorrow

## 2020-05-14 VITALS — SYSTOLIC BLOOD PRESSURE: 125 MMHG | DIASTOLIC BLOOD PRESSURE: 94 MMHG | HEART RATE: 70 BPM

## 2020-05-14 NOTE — PCM.PNPP
- General Info


Date of Service: 20


Functional Status: Reports: Pain Controlled, Tolerating Diet, Ambulating, 

Urinating





- Review of Systems


General: Reports: No Symptoms


Pulmonary: Reports: No Symptoms


Cardiovascular: Reports: No Symptoms


Gastrointestinal: Reports: No Symptoms


Genitourinary: Reports: No Symptoms


Musculoskeletal: Reports: No Symptoms


Neurological: Reports: No Symptoms





- Patient Data


Vital Signs - Most Recent: 


 Last Vital Signs











Temp  37.1 C   20 15:43


 


Pulse  84   20 15:43


 


Resp  16   20 09:35


 


BP  137/71   20 15:43


 


Pulse Ox  98   20 15:43











Weight - Most Recent: 139.253 kg


I&O - Last 24 Hours: 


 Intake & Output











 20





 14:59 22:59 06:59


 


Intake Total 240  


 


Balance 240  











Med Orders - Current: 


 Current Medications





Acetaminophen (Tylenol)  650 mg PO Q4H PRN


   PRN Reason: mild pain or fever


   Last Admin: 20 18:11 Dose:  650 mg


Benzocaine/Menthol (Dermoplast Pain Relief Spray)  0 gm TOP ASDIRECTED PRN


   PRN Reason: Perineal Comfort Measure


   Last Admin: 20 18:42 Dose:  1 can


Docusate Sodium (Colace)  100 mg PO BID PRN


   PRN Reason: Constipation


Ibuprofen (Motrin)  600 mg PO Q6H PRN


   PRN Reason: Mild pain or fever


   Last Admin: 20 23:29 Dose:  600 mg


Witch Hazel (Tucks)  1 pad TOP ASDIRECTED PRN


   PRN Reason: Perineal Comfort Measure


   Last Admin: 20 18:09 Dose:  1 tub





Discontinued Medications





Bupivacaine HCl (Sensorcaine-Mpf 0.25%)  10 ml .ROUTE .STK-MED ONE


   Stop: 20 00:01


Diphenhydramine HCl (Benadryl)  25 mg IVPUSH Q6H PRN


   PRN Reason: pruritis


Ephedrine Sulfate (Ephedrine Sulfate)  5 mg IVPUSH ASDIRECTED PRN


   PRN Reason: Hypotension


Fentanyl (Sublimaze) Confirm Administered Dose 100 mcg .ROUTE .STK-MED ONE


   Stop: 20 07:42


   Last Admin: 20 07:50 Dose:  Not Given


Fentanyl (Sublimaze)  100 mcg IVPUSH ONETIME ONE


   Stop: 20 07:47


   Last Admin: 20 07:50 Dose:  100 mcg


Fentanyl/Bupivacaine HCl (Fentanyl/Bupivacaine/Ns 2 Mcg-0.125% 100 Ml)  100 ml 

EPIDUR ASDIRECTED YARED


   Last Admin: 20 07:51 Dose:  100 ml


Lactated Ringer's (Ringers, Lactated)  1,000 mls @ 40 mls/hr IV ASDIRECTED YARED


   Last Admin: 20 08:50 Dose:  50 mls/hr


Lactated Ringer's (Ringers, Lactated)  1,000 mls @ 125 mls/hr IV ASDIRECTED YARED


Oxytocin/Lactated Ringer's (Pitocin In Lr 10 Units/1,000 Ml)  10 unit in 1,000 

mls @ 12 mls/hr IV TITRATE YARED; Protocol


   Last Titration: 20 13:42 Dose:  20 munits/min, 120 mls/hr


Oxytocin/Lactated Ringer's (Pitocin In Lr 10 Units/1,000 Ml)  10 unit in 1,000 

mls @ 500 mls/hr IV .CONTINUOUS YARED


   Last Admin: 20 17:00 Dose:  500 mls/hr


Misoprostol (Cytotec) Confirm Administered Dose 200 mcg .ROUTE .STK-MED ONE


   Stop: 20 16:28


   Last Admin: 20 16:42 Dose:  600 mcg


Misoprostol (Cytotec)  600 mcg PO NOW STA


   Stop: 20 16:40


   Last Admin: 20 17:41 Dose:  Not Given


Nalbuphine HCl (Nubain)  10 mg IVPUSH Q2H PRN


   PRN Reason: Pain


Ondansetron HCl (Zofran)  4 mg IVPUSH Q4H PRN


   PRN Reason: Nausea/Vomiting


Sodium Chloride (Saline Flush)  10 ml FLUSH ASDIRECTED PRN


   PRN Reason: Keep Vein Open











- Infant Interaction


Infant Disposition, Postpartum:  in Room with Family


Infant Interaction: Holding Infant


Infant Feeding:  Infant; Nursed Well


Support Person: 





- Postpartum Recovery Exam


Fundal Tone: Firm


Fundal Level: At Umbilicus


Fundal Placement: Midline


Lochia Amount: Small


Lochia Color: Rubra/Red


Perineum Description: Other (see below)


Other Perinuem Description: 1st degree with repair


Episiotomy/Laceration: Approximated


Bladder Status: Voiding


Urinary Elimination: Voided





- Exam


General: Alert, Oriented, Cooperative


GI/Abdominal Exam: Soft, Non-Tender


Extremities: Normal Inspection


Skin: Warm, Dry, Intact





- Problem List & Annotations


(1) Post-dates pregnancy


SNOMED Code(s): 44042547


   Code(s): O48.0 - POST-TERM PREGNANCY   Status: Acute   Current Visit: Yes   


Qualifiers: 


   Post-term pregnancy type: 40-42 weeks gestation   Qualified Code(s): O48.0 - 

Post-term pregnancy   





(2) Fibroid uterus


SNOMED Code(s): 32370639


   Code(s): D25.9 - LEIOMYOMA OF UTERUS, UNSPECIFIED   Status: Acute   Current 

Visit: Yes   


Qualifiers: 


   Uterine leiomyoma location: unspecified location   Qualified Code(s): D25.9 

- Leiomyoma of uterus, unspecified   





(3) Vaginal delivery


SNOMED Code(s): 091992964


   Code(s): O80 - ENCOUNTER FOR FULL-TERM UNCOMPLICATED DELIVERY   Status: 

Acute   Current Visit: Yes   





- Problem List Review


Problem List Initiated/Reviewed/Updated: Yes





- My Orders


Last 24 Hours: 


My Active Orders





20 07:09


Ready for Discharge [RC] PER UNIT ROUTINE 





20 17:04


Heat Therapy [OM.PC] PRN 





20 04:31


Docusate Sodium [Colace]   100 mg PO BID PRN 





20 05:38


Ready for Discharge [RC] PER UNIT ROUTINE 














- Assessment


Assessment:: 





PPD#2





- Plan


Plan:: 





* Routine cares 


* BP's normal 


* Breast feeding 


* Discharge home today